# Patient Record
Sex: FEMALE | Race: OTHER | Employment: UNEMPLOYED | URBAN - METROPOLITAN AREA
[De-identification: names, ages, dates, MRNs, and addresses within clinical notes are randomized per-mention and may not be internally consistent; named-entity substitution may affect disease eponyms.]

---

## 2017-02-28 ENCOUNTER — OFFICE VISIT (OUTPATIENT)
Dept: FAMILY MEDICINE CLINIC | Age: 27
End: 2017-02-28

## 2017-02-28 VITALS
TEMPERATURE: 98.2 F | HEIGHT: 62 IN | DIASTOLIC BLOOD PRESSURE: 77 MMHG | SYSTOLIC BLOOD PRESSURE: 118 MMHG | WEIGHT: 146 LBS | OXYGEN SATURATION: 98 % | RESPIRATION RATE: 18 BRPM | BODY MASS INDEX: 26.87 KG/M2 | HEART RATE: 85 BPM

## 2017-02-28 DIAGNOSIS — R30.0 BURNING WITH URINATION: Primary | ICD-10-CM

## 2017-02-28 DIAGNOSIS — N94.9 VAGINAL BURNING: ICD-10-CM

## 2017-02-28 DIAGNOSIS — R10.9 FLANK PAIN: ICD-10-CM

## 2017-02-28 LAB
BILIRUB UR QL STRIP: NEGATIVE
GLUCOSE UR-MCNC: NEGATIVE MG/DL
KETONES P FAST UR STRIP-MCNC: NEGATIVE MG/DL
PH UR STRIP: 6.5 [PH] (ref 4.6–8)
PROT UR QL STRIP: NEGATIVE MG/DL
SP GR UR STRIP: 1.02 (ref 1–1.03)
UA UROBILINOGEN AMB POC: NORMAL (ref 0.2–1)
URINALYSIS CLARITY POC: CLEAR
URINALYSIS COLOR POC: YELLOW
URINE BLOOD POC: NEGATIVE
URINE LEUKOCYTES POC: NEGATIVE
URINE NITRITES POC: NEGATIVE

## 2017-02-28 NOTE — PATIENT INSTRUCTIONS
Painful Urination (Dysuria): Care Instructions  Your Care Instructions  Burning pain with urination (dysuria) is a common symptom of a urinary tract infection or other urinary problems. The bladder may become inflamed. This can cause pain when the bladder fills and empties. You may also feel pain if the tube that carries urine from the bladder to the outside of the body (urethra) gets irritated or infected. Sexually transmitted infections (STIs) also may cause pain when you urinate. Sometimes the pain can be caused by things other than an infection. The urethra can be irritated by soaps, perfumes, or foreign objects in the urethra. Kidney stones can cause pain when they pass through the urethra. The cause may be hard to find. You may need tests. Treatment for painful urination depends on the cause. Follow-up care is a key part of your treatment and safety. Be sure to make and go to all appointments, and call your doctor if you are having problems. It's also a good idea to know your test results and keep a list of the medicines you take. How can you care for yourself at home? · Drink extra water and juices such as cranberry and blueberry juices for the next day or two. This will help make the urine less concentrated. And it may help wash out any bacteria that may be causing an infection. (If you have kidney, heart, or liver disease and have to limit fluids, talk with your doctor before you increase the amount of fluids you drink.)  · Avoid drinks that are carbonated or have caffeine. They can irritate the bladder. · Urinate often. Try to empty your bladder each time. For women:  · Urinate right after you have sex. · After going to the bathroom, wipe from front to back. · Avoid douches, bubble baths, and feminine hygiene sprays. And avoid other feminine hygiene products that have deodorants. When should you call for help?   Call your doctor now or seek immediate medical care if:  · You have new symptoms, such as fever, nausea, or vomiting. · You have new or worse symptoms of a urinary problem. For example:  ¨ You have blood or pus in your urine. ¨ You have chills or body aches. ¨ It hurts worse to urinate. ¨ You have groin or belly pain. ¨ You have pain in your back just below your rib cage (the flank area). Watch closely for changes in your health, and be sure to contact your doctor if you have any problems. Where can you learn more? Go to http://leticia-joe.info/. Enter S328 in the search box to learn more about \"Painful Urination (Dysuria): Care Instructions. \"  Current as of: August 12, 2016  Content Version: 11.1  © 2445-2128 [employer]. Care instructions adapted under license by [employer] (which disclaims liability or warranty for this information). If you have questions about a medical condition or this instruction, always ask your healthcare professional. [place] [address] any warranty or liability for your use of this information. Human Papillomavirus (HPV): Care Instructions  Your Care Instructions  The human papillomavirus (HPV) is a very common virus. There are many types of HPV. Some types cause the common skin wart. Other types cause genital warts, which can be spread by sexual contact. Some types can increase the risk for cervical and anal cancer. Having one type of HPV does not lead to having another type. Many women who have HPV may not know that they are infected until it is found with a Pap test. Your doctor uses this test to look for abnormal cells on your cervix. If you have had an abnormal Pap test, your doctor may recommend that you have an HPV test.  Like a Pap test, an HPV test is done on a sample of cells collected from the cervix. If the test finds that you have the types of HPV that might lead to cancer, your doctor may suggest more tests.  This does not mean that you will develop cancer; it means that you may have an increased risk. Abnormal cell changes caused by HPV often go away on their own. If the changes do not go away, they can be treated. But because HPV can stay inside the body, the abnormal cervical cells sometimes come back. This is why it is important to follow up with your doctor and have regular Pap tests. Follow-up care is a key part of your treatment and safety. Be sure to make and go to all appointments, and call your doctor if you are having problems. Its also a good idea to know your test results and keep a list of the medicines you take. How can you care for yourself at home? · If you are going to have a Pap or HPV test, do not douche or use tampons or vaginal creams in the 24 hours before the test.  · Do not smoke. Smoking increases the risk for cervical problems and abnormal Pap tests. If you need help quitting, talk to your doctor about stop-smoking programs and medicines. These can increase your chances of quitting for good. · Use latex condoms every time you have sex. Use them from the beginning to the end of sexual contact. · Be sure to tell your sexual partner or partners that you have HPV. Even if you do not have symptoms, you can still pass HPV to others. · Having one sex partner (who does not have STIs and does not have sex with anyone else) is a good way to avoid STIs. When should you call for help? Watch closely for changes in your health, and be sure to contact your doctor if:  · You have vaginal pain during or after sex. · You have vaginal bleeding when you are not in your menstrual period. Where can you learn more? Go to http://leticia-joe.info/. Enter F690 in the search box to learn more about \"Human Papillomavirus (HPV): Care Instructions. \"  Current as of: May 27, 2016  Content Version: 11.1  © 1152-9523 [employer].  Care instructions adapted under license by [employer] (which disclaims liability or warranty for this information). If you have questions about a medical condition or this instruction, always ask your healthcare professional. [place] [address] any warranty or liability for your use of this information.

## 2017-02-28 NOTE — PROGRESS NOTES
Chief Complaint   Patient presents with    Well Woman     Requesting well woman & CPE      1. Have you been to the ER, urgent care clinic since your last visit? Hospitalized since your last visit? No    2. Have you seen or consulted any other health care providers outside of the [address] [place] [address] since your last visit? Include any pap smears or colon screening.  No

## 2017-02-28 NOTE — MR AVS SNAPSHOT
Visit Information Date & Time Provider Department Dept. Phone Encounter #  
 2/28/2017  9:00 AM Vish Julian  [facility] [phone] [id number] Follow-up Instructions Return in about 4 weeks (around 3/28/2017) for well woman check. Upcoming Health Maintenance Date Due  
 HPV AGE 9Y-34Y (1 of 3 - Female 3 Dose Series) 3/11/2001 DTaP/Tdap/Td series (1 - Tdap) 3/11/2011 INFLUENZA AGE 9 TO ADULT 8/1/2016 PAP AKA CERVICAL CYTOLOGY 3/24/2018 Allergies as of 2/28/2017  Review Complete On: 2/28/2017 By: Tab Hancock No Known Allergies Current Immunizations  Never Reviewed No immunizations on file. Not reviewed this visit You Were Diagnosed With   
  
 Codes Comments Burning with urination    -  Primary ICD-10-CM: R30.0 ICD-9-CM: 461. 1 Flank pain     ICD-10-CM: R10.9 ICD-9-CM: 789.09 Vaginal burning     ICD-10-CM: N94.9 ICD-9-CM: 625.8 Vitals BP  
  
  
  
  
  
 118/77 (BP 1 Location: Right arm, BP Patient Position: Sitting) BMI and BSA Data Body Mass Index Body Surface Area  
 26.7 kg/m 2 1.7 m 2 Preferred Pharmacy Pharmacy Name Phone [id number], [address] [phone] Your Updated Medication List  
  
Notice  As of 2/28/2017 10:02 AM  
 You have not been prescribed any medications. We Performed the Following AMB POC URINALYSIS DIP STICK AUTO W/O MICRO [69142 CPT(R)] CULTURE, URINE [id number] CPT(R)] [address] [id number] Custom] Follow-up Instructions Return in about 4 weeks (around 3/28/2017) for well woman check. Patient Instructions Painful Urination (Dysuria): Care Instructions Your Care Instructions Burning pain with urination (dysuria) is a common symptom of a urinary tract infection or other urinary problems.  The bladder may become inflamed. This can cause pain when the bladder fills and empties. You may also feel pain if the tube that carries urine from the bladder to the outside of the body (urethra) gets irritated or infected. Sexually transmitted infections (STIs) also may cause pain when you urinate. Sometimes the pain can be caused by things other than an infection. The urethra can be irritated by soaps, perfumes, or foreign objects in the urethra. Kidney stones can cause pain when they pass through the urethra. The cause may be hard to find. You may need tests. Treatment for painful urination depends on the cause. Follow-up care is a key part of your treatment and safety. Be sure to make and go to all appointments, and call your doctor if you are having problems. It's also a good idea to know your test results and keep a list of the medicines you take. How can you care for yourself at home? · Drink extra water and juices such as cranberry and blueberry juices for the next day or two. This will help make the urine less concentrated. And it may help wash out any bacteria that may be causing an infection. (If you have kidney, heart, or liver disease and have to limit fluids, talk with your doctor before you increase the amount of fluids you drink.) · Avoid drinks that are carbonated or have caffeine. They can irritate the bladder. · Urinate often. Try to empty your bladder each time. For women: · Urinate right after you have sex. · After going to the bathroom, wipe from front to back. · Avoid douches, bubble baths, and feminine hygiene sprays. And avoid other feminine hygiene products that have deodorants. When should you call for help? Call your doctor now or seek immediate medical care if: 
· You have new symptoms, such as fever, nausea, or vomiting. · You have new or worse symptoms of a urinary problem. For example: ¨ You have blood or pus in your urine. ¨ You have chills or body aches. ¨ It hurts worse to urinate. ¨ You have groin or belly pain. ¨ You have pain in your back just below your rib cage (the flank area). Watch closely for changes in your health, and be sure to contact your doctor if you have any problems. Where can you learn more? Go to http://leticia-joe.info/. Enter P599 in the search box to learn more about \"Painful Urination (Dysuria): Care Instructions. \" Current as of: August 12, 2016 Content Version: 11.1 © 4048-9305 [employer]. Care instructions adapted under license by [employer] (which disclaims liability or warranty for this information). If you have questions about a medical condition or this instruction, always ask your healthcare professional. [address] any warranty or liability for your use of this information. Human Papillomavirus (HPV): Care Instructions Your Care Instructions The human papillomavirus (HPV) is a very common virus. There are many types of HPV. Some types cause the common skin wart. Other types cause genital warts, which can be spread by sexual contact. Some types can increase the risk for cervical and anal cancer. Having one type of HPV does not lead to having another type. Many women who have HPV may not know that they are infected until it is found with a Pap test. Your doctor uses this test to look for abnormal cells on your cervix. If you have had an abnormal Pap test, your doctor may recommend that you have an HPV test. 
Like a Pap test, an HPV test is done on a sample of cells collected from the cervix. If the test finds that you have the types of HPV that might lead to cancer, your doctor may suggest more tests. This does not mean that you will develop cancer; it means that you may have an increased risk. Abnormal cell changes caused by HPV often go away on their own. If the changes do not go away, they can be treated.  But because HPV can stay inside the body, the abnormal cervical cells sometimes come back. This is why it is important to follow up with your doctor and have regular Pap tests. Follow-up care is a key part of your treatment and safety. Be sure to make and go to all appointments, and call your doctor if you are having problems. Its also a good idea to know your test results and keep a list of the medicines you take. How can you care for yourself at home? · If you are going to have a Pap or HPV test, do not douche or use tampons or vaginal creams in the 24 hours before the test. 
· Do not smoke. Smoking increases the risk for cervical problems and abnormal Pap tests. If you need help quitting, talk to your doctor about stop-smoking programs and medicines. These can increase your chances of quitting for good. · Use latex condoms every time you have sex. Use them from the beginning to the end of sexual contact. · Be sure to tell your sexual partner or partners that you have HPV. Even if you do not have symptoms, you can still pass HPV to others. · Having one sex partner (who does not have STIs and does not have sex with anyone else) is a good way to avoid STIs. When should you call for help? Watch closely for changes in your health, and be sure to contact your doctor if: 
· You have vaginal pain during or after sex. · You have vaginal bleeding when you are not in your menstrual period. Where can you learn more? Go to http://leticia-joe.info/. Enter F690 in the search box to learn more about \"Human Papillomavirus (HPV): Care Instructions. \" Current as of: May 27, 2016 Content Version: 11.1 © 8340-0011 [employer]. Care instructions adapted under license by [employer] (which disclaims liability or warranty for this information).  If you have questions about a medical condition or this instruction, always ask your healthcare professional. [employer] disclaims any warranty or liability for your use of this information. Introducing [facility]! Dagoberto Carrington introduces [employer] patient portal. Now you can access parts of your medical record, email your doctor's office, and request medication refills online. 1. In your internet browser, go to [email or web address] 2. Click on the First Time User? Click Here link in the Sign In box. You will see the New Member Sign Up page. 3. Enter your [employer] Access Code exactly as it appears below. You will not need to use this code after youve completed the sign-up process. If you do not sign up before the expiration date, you must request a new code. · [employer] Access Code: [email or web address] Expires: 5/29/2017 10:02 AM 
 
4. Enter the last four digits of your Social Security Number (xxxx) and Date of Birth (mm/dd/yyyy) as indicated and click Submit. You will be taken to the next sign-up page. 5. Create a [employer] ID. This will be your [employer] login ID and cannot be changed, so think of one that is secure and easy to remember. 6. Create a [employer] password. You can change your password at any time. 7. Enter your Password Reset Question and Answer. This can be used at a later time if you forget your password. 8. Enter your e-mail address. You will receive e-mail notification when new information is available in [address] 9. Click Sign Up. You can now view and download portions of your medical record. 10. Click the Download Summary menu link to download a portable copy of your medical information. If you have questions, please visit the Frequently Asked Questions section of the [employer] website. Remember, [employer] is NOT to be used for urgent needs. For medical emergencies, dial 911. Now available from your iPhone and Android! Please provide this summary of care documentation to your next provider. Your primary care clinician is listed as Adriana Soares. If you have any questions after today's visit, please call [phone].

## 2017-03-01 NOTE — PROGRESS NOTES
Dipika Bourgeois is a 32 y.o. female who presents with the following complaints:  Chief Complaint   Patient presents with    Well Woman     Requesting well woman & CPE        Subjective:    HPI:   C/o burning pain with urination, increased vaginal discharge, sensation of \"warmness\" or heat in the vagina, and left flank and low back pain. She is concerned since these are the symptoms she had when she developed pyelo before. No fever or chills. No visible blood in urine. No pelvic pain or pain with intercourse. Pertinent PMH/FH/SH:  No past medical history on file. Past Surgical History:   Procedure Laterality Date    HX LIPOSUCTION Bilateral 2014     Family History   Problem Relation Age of Onset    Hypertension Mother      Social History     Social History    Marital status: LEGALLY      Spouse name: N/A    Number of children: N/A    Years of education: N/A     Social History Main Topics    Smoking status: Never Smoker    Smokeless tobacco: Never Used    Alcohol use No    Drug use: No    Sexual activity: Yes     Birth control/ protection: None     Other Topics Concern    None     Social History Narrative     Advanced Directives: N      There are no active problems to display for this patient.       Nurse notes were reviewed and are correct  Review of Systems - negative except as listed above in the HPI    Objective:     Vitals:    02/28/17 0923   BP: 118/77   Pulse: 85   Resp: 18   Temp: 98.2 °F (36.8 °C)   TempSrc: Oral   SpO2: 98%   Weight: 146 lb (66.2 kg)   Height: 5' 2\" (1.575 m)     Physical Examination: General appearance - alert, well appearing, and in no distress, oriented to person, place, and time and normal appearing weight  Mental status - normal mood, behavior, speech, dress, motor activity, and thought processes  Neck - supple, no significant adenopathy  Chest - clear to auscultation, no wheezes, rales or rhonchi, symmetric air entry  Heart - normal rate, regular rhythm, normal S1, S2, no murmurs, rubs, clicks or gallops  Abdomen - soft, nontender, nondistended, no masses or organomegaly  bowel sounds normal  no bladder distension noted  no CVA tenderness  Neurological - alert, oriented, normal speech, no focal findings or movement disorder noted  Skin - normal coloration and turgor    Results for orders placed or performed in visit on 02/28/17   AMB POC URINALYSIS DIP STICK AUTO W/O MICRO   Result Value Ref Range    Color (UA POC) Yellow     Clarity (UA POC) Clear     Glucose (UA POC) Negative Negative    Bilirubin (UA POC) Negative Negative    Ketones (UA POC) Negative Negative    Specific gravity (UA POC) 1.025 1.001 - 1.035    Blood (UA POC) Negative Negative    pH (UA POC) 6.5 4.6 - 8.0    Protein (UA POC) Negative Negative mg/dL    Urobilinogen (UA POC) 0.2 mg/dL 0.2 - 1    Nitrites (UA POC) Negative Negative    Leukocyte esterase (UA POC) Negative Negative         Assessment/ Plan:   Liam Alvarado was seen today for well woman. Diagnoses and all orders for this visit:    Burning with urination  Flank pain  Push fluids  Avoid bladder irritants  Add cranberry  Wipe front to back  Empty bladder frequently  -     AMB POC URINALYSIS DIP STICK AUTO W/O MICRO  -     CULTURE, URINE    Vaginal burning  -     NUSWAB VAGINITIS PLUS    Patient's last pap 3/24/14, normal. She will return for WWE after that date in order to have visit covered by her insurance. Follow-up Disposition:  Return in about 4 weeks (around 3/28/2017) for well woman check. I have discussed the diagnosis with the patient and the intended plan as seen in the above orders. The patient has received an after-visit summary and questions were answered concerning future plans. The patient verbalizes understanding.     Medication Side Effects and Warnings were discussed with patient: yes  Patient Labs were reviewed and or requested: yes  Patient Past Records were reviewed and or requested: yes    Patient Instructions Painful Urination (Dysuria): Care Instructions  Your Care Instructions  Burning pain with urination (dysuria) is a common symptom of a urinary tract infection or other urinary problems. The bladder may become inflamed. This can cause pain when the bladder fills and empties. You may also feel pain if the tube that carries urine from the bladder to the outside of the body (urethra) gets irritated or infected. Sexually transmitted infections (STIs) also may cause pain when you urinate. Sometimes the pain can be caused by things other than an infection. The urethra can be irritated by soaps, perfumes, or foreign objects in the urethra. Kidney stones can cause pain when they pass through the urethra. The cause may be hard to find. You may need tests. Treatment for painful urination depends on the cause. Follow-up care is a key part of your treatment and safety. Be sure to make and go to all appointments, and call your doctor if you are having problems. It's also a good idea to know your test results and keep a list of the medicines you take. How can you care for yourself at home? · Drink extra water and juices such as cranberry and blueberry juices for the next day or two. This will help make the urine less concentrated. And it may help wash out any bacteria that may be causing an infection. (If you have kidney, heart, or liver disease and have to limit fluids, talk with your doctor before you increase the amount of fluids you drink.)  · Avoid drinks that are carbonated or have caffeine. They can irritate the bladder. · Urinate often. Try to empty your bladder each time. For women:  · Urinate right after you have sex. · After going to the bathroom, wipe from front to back. · Avoid douches, bubble baths, and feminine hygiene sprays. And avoid other feminine hygiene products that have deodorants. When should you call for help?   Call your doctor now or seek immediate medical care if:  · You have new symptoms, such as fever, nausea, or vomiting. · You have new or worse symptoms of a urinary problem. For example:  ¨ You have blood or pus in your urine. ¨ You have chills or body aches. ¨ It hurts worse to urinate. ¨ You have groin or belly pain. ¨ You have pain in your back just below your rib cage (the flank area). Watch closely for changes in your health, and be sure to contact your doctor if you have any problems. Where can you learn more? Go to http://leticia-joe.info/. Enter Z321 in the search box to learn more about \"Painful Urination (Dysuria): Care Instructions. \"  Current as of: August 12, 2016  Content Version: 11.1  © 6947-9500 [employer]. Care instructions adapted under license by [employer] (which disclaims liability or warranty for this information). If you have questions about a medical condition or this instruction, always ask your healthcare professional. [place] [address] any warranty or liability for your use of this information. Human Papillomavirus (HPV): Care Instructions  Your Care Instructions  The human papillomavirus (HPV) is a very common virus. There are many types of HPV. Some types cause the common skin wart. Other types cause genital warts, which can be spread by sexual contact. Some types can increase the risk for cervical and anal cancer. Having one type of HPV does not lead to having another type. Many women who have HPV may not know that they are infected until it is found with a Pap test. Your doctor uses this test to look for abnormal cells on your cervix. If you have had an abnormal Pap test, your doctor may recommend that you have an HPV test.  Like a Pap test, an HPV test is done on a sample of cells collected from the cervix. If the test finds that you have the types of HPV that might lead to cancer, your doctor may suggest more tests.  This does not mean that you will develop cancer; it means that you may have an increased risk. Abnormal cell changes caused by HPV often go away on their own. If the changes do not go away, they can be treated. But because HPV can stay inside the body, the abnormal cervical cells sometimes come back. This is why it is important to follow up with your doctor and have regular Pap tests. Follow-up care is a key part of your treatment and safety. Be sure to make and go to all appointments, and call your doctor if you are having problems. Its also a good idea to know your test results and keep a list of the medicines you take. How can you care for yourself at home? · If you are going to have a Pap or HPV test, do not douche or use tampons or vaginal creams in the 24 hours before the test.  · Do not smoke. Smoking increases the risk for cervical problems and abnormal Pap tests. If you need help quitting, talk to your doctor about stop-smoking programs and medicines. These can increase your chances of quitting for good. · Use latex condoms every time you have sex. Use them from the beginning to the end of sexual contact. · Be sure to tell your sexual partner or partners that you have HPV. Even if you do not have symptoms, you can still pass HPV to others. · Having one sex partner (who does not have STIs and does not have sex with anyone else) is a good way to avoid STIs. When should you call for help? Watch closely for changes in your health, and be sure to contact your doctor if:  · You have vaginal pain during or after sex. · You have vaginal bleeding when you are not in your menstrual period. Where can you learn more? Go to http://leticia-joe.info/. Enter F690 in the search box to learn more about \"Human Papillomavirus (HPV): Care Instructions. \"  Current as of: May 27, 2016  Content Version: 11.1  © 0405-5267 [employer].  Care instructions adapted under license by [employer] (which disclaims liability or warranty for this information). If you have questions about a medical condition or this instruction, always ask your healthcare professional. [place] [address] any warranty or liability for your use of this information.           Jared MUKHERJEE

## 2017-03-02 LAB — BACTERIA UR CULT: NO GROWTH

## 2017-03-04 LAB
A VAGINAE DNA VAG QL NAA+PROBE: NORMAL SCORE
BVAB2 DNA VAG QL NAA+PROBE: NORMAL SCORE
C ALBICANS DNA VAG QL NAA+PROBE: NEGATIVE
C GLABRATA DNA VAG QL NAA+PROBE: NEGATIVE
C TRACH RRNA SPEC QL NAA+PROBE: NEGATIVE
MEGA1 DNA VAG QL NAA+PROBE: NORMAL SCORE
N GONORRHOEA RRNA SPEC QL NAA+PROBE: NEGATIVE
T VAGINALIS RRNA SPEC QL NAA+PROBE: NEGATIVE

## 2017-03-06 NOTE — PROGRESS NOTES
Spoke with patient using two pt identifiers. Patient has been advised of negative urine culture results. Patient verbalized understanding and had no questions at this time.

## 2017-03-06 NOTE — PROGRESS NOTES
Spoke with patient using two pt identifiers. Patient has been advised of negative vaginal swab results. Patient states that her results are always  Negative, however, she is starting to have some discharge. She denies and odor and states that it is the same color. Patient would josé to know if there can be something called in for her. Please advise. Thanks.

## 2017-03-07 NOTE — PROGRESS NOTES
When I last treated her, her swab was positive for BV. i can't prescribe treatment at the present time with negative swab. If symptoms are bothersome, i recommend we put in a referral to gyn for further evaluation.

## 2017-03-08 NOTE — PROGRESS NOTES
Left message for patient and advised of note from [facility]. Patient advised to return call with any questions/ concerns.

## 2017-05-02 ENCOUNTER — HOSPITAL ENCOUNTER (OUTPATIENT)
Dept: GENERAL RADIOLOGY | Age: 27
Discharge: HOME | End: 2017-05-02

## 2017-05-02 ENCOUNTER — OFFICE VISIT (OUTPATIENT)
Dept: INTERNAL MEDICINE CLINIC | Age: 27
End: 2017-05-02

## 2017-05-02 VITALS
TEMPERATURE: 98.5 F | BODY MASS INDEX: 26.43 KG/M2 | DIASTOLIC BLOOD PRESSURE: 94 MMHG | RESPIRATION RATE: 16 BRPM | HEART RATE: 86 BPM | SYSTOLIC BLOOD PRESSURE: 128 MMHG | HEIGHT: 62 IN | WEIGHT: 143.6 LBS | OXYGEN SATURATION: 92 %

## 2017-05-02 DIAGNOSIS — M25.511 ACUTE PAIN OF BOTH SHOULDERS: ICD-10-CM

## 2017-05-02 DIAGNOSIS — M62.838 MUSCLE SPASM: ICD-10-CM

## 2017-05-02 DIAGNOSIS — R07.9 CHEST PAIN AT REST: ICD-10-CM

## 2017-05-02 DIAGNOSIS — M25.512 ACUTE PAIN OF BOTH SHOULDERS: ICD-10-CM
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
## 2017-05-02 DIAGNOSIS — M54.2 NECK PAIN: ICD-10-CM

## 2017-05-02 DIAGNOSIS — R03.0 TEMPORARY HIGH BLOOD PRESSURE: ICD-10-CM
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
## 2017-05-02 PROCEDURE — 73030 X-RAY EXAM OF SHOULDER: CPT

## 2017-05-02 PROCEDURE — 70360 X-RAY EXAM OF NECK: CPT

## 2017-05-02 PROCEDURE — 72050 X-RAY EXAM NECK SPINE 4/5VWS: CPT

## 2017-05-02 PROCEDURE — 71020 XR CHEST PA LAT: CPT

## 2017-05-02 RX ORDER — IBUPROFEN 400 MG/1
400 TABLET ORAL
COMMUNITY
Start: 2017-05-02 | End: 2018-02-12 | Stop reason: ALTCHOICE

## 2017-05-02 RX ORDER — METHOCARBAMOL 500 MG/1
500 TABLET, FILM COATED ORAL 3 TIMES DAILY
Qty: 30 TAB | Refills: 0 | Status: SHIPPED | OUTPATIENT
Start: 2017-05-02 | End: 2017-05-11 | Stop reason: SDUPTHER

## 2017-05-02 RX ORDER — CYCLOBENZAPRINE HCL 10 MG
TABLET ORAL
COMMUNITY
End: 2017-05-02

## 2017-05-02 RX ORDER — IBUPROFEN 600 MG/1
TABLET ORAL
COMMUNITY
End: 2017-05-02

## 2017-05-02 NOTE — LETTER
NOTIFICATION RETURN TO WORK / SCHOOL 
 
5/2/2017 10:38 AM 
 
Ms. Argentina Bullock [address] [place] [id number] To Whom It May Concern: 
 
Argentina Bullock is currently under the care of [address] [place] [address]. She will return to work/school on: 5/9 If there are questions or concerns please have the patient contact our office. Sincerely, Karli Easley MD
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
## 2017-05-04 NOTE — PROGRESS NOTES
Ms. Weir ,     All imaging tests are normal.   You need therapies and along with anti-inflammatory motrin with muscle relaxants. Massage therapy may help. Please check with your insurance on coverage. Will you at your next follow up appointment.      Kindly,

## 2017-05-04 NOTE — PROGRESS NOTES
MsKeny Day More,     All imaging tests are normal.   You need therapies and along with anti-inflammatory motrin with muscle relaxants. Massage therapy may help. Please check with your insurance on coverage. Will you at your next follow up appointment.      Kindly,

## 2017-05-04 NOTE — PROGRESS NOTES
Ms. Gabriele Belcher,     All imaging tests are normal.   You need therapies and along with anti-inflammatory motrin with muscle relaxants. Massage therapy may help. Please check with your insurance on coverage. Will you at your next follow up appointment.      Kindly,

## 2017-05-04 NOTE — PROGRESS NOTES
Ms. Galdino Jett,     All imaging tests are normal.   You need therapies and along with anti-inflammatory motrin with muscle relaxants. Massage therapy may help. Please check with your insurance on coverage. Will you at your next follow up appointment.      Kindly,

## 2017-05-04 NOTE — PROGRESS NOTES
Ms. Lozano Faster,     All imaging tests are normal.   You need therapies and along with anti-inflammatory motrin with muscle relaxants. Massage therapy may help. Please check with your insurance on coverage. Will you at your next follow up appointment.      Kindly,
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
## 2017-05-11 ENCOUNTER — OFFICE VISIT (OUTPATIENT)
Dept: INTERNAL MEDICINE CLINIC | Age: 27
End: 2017-05-11

## 2017-05-11 VITALS
DIASTOLIC BLOOD PRESSURE: 72 MMHG | TEMPERATURE: 98.8 F | OXYGEN SATURATION: 99 % | HEART RATE: 96 BPM | RESPIRATION RATE: 16 BRPM | BODY MASS INDEX: 26.46 KG/M2 | SYSTOLIC BLOOD PRESSURE: 110 MMHG | WEIGHT: 143.8 LBS | HEIGHT: 62 IN

## 2017-05-11 DIAGNOSIS — M62.838 MUSCLE SPASMS OF NECK: ICD-10-CM

## 2017-05-11 DIAGNOSIS — M54.2 NECK PAIN: Primary | ICD-10-CM

## 2017-05-11 DIAGNOSIS — M62.830 MUSCLE SPASM OF BACK: ICD-10-CM

## 2017-05-11 DIAGNOSIS — M54.9 ACUTE BILATERAL BACK PAIN, UNSPECIFIED BACK LOCATION: ICD-10-CM

## 2017-05-11 RX ORDER — METHOCARBAMOL 500 MG/1
1000 TABLET, FILM COATED ORAL 3 TIMES DAILY
Qty: 30 TAB | Refills: 0 | Status: SHIPPED | OUTPATIENT
Start: 2017-05-11 | End: 2018-02-12 | Stop reason: ALTCHOICE
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
## 2017-05-11 NOTE — LETTER
NOTIFICATION RETURN TO WORK / SCHOOL 
 
5/11/2017 10:35 AM 
 
Ms. Ebenezer Franklin [address] [place] [id number] To Whom It May Concern: 
 
Ebenezer Areas is currently under the care of [address] [place] [address]. She will return to work/school on: 5/25 If there are questions or concerns please have the patient contact our office. Sincerely, Hodan Michael MD

## 2017-05-11 NOTE — PATIENT INSTRUCTIONS
Back Pain: Care Instructions  Your Care Instructions    Back pain has many possible causes. It is often related to problems with muscles and ligaments of the back. It may also be related to problems with the nerves, discs, or bones of the back. Moving, lifting, standing, sitting, or sleeping in an awkward way can strain the back. Sometimes you don't notice the injury until later. Arthritis is another common cause of back pain. Although it may hurt a lot, back pain usually improves on its own within several weeks. Most people recover in 12 weeks or less. Using good home treatment and being careful not to stress your back can help you feel better sooner. Follow-up care is a key part of your treatment and safety. Be sure to make and go to all appointments, and call your doctor if you are having problems. Its also a good idea to know your test results and keep a list of the medicines you take. How can you care for yourself at home? · Sit or lie in positions that are most comfortable and reduce your pain. Try one of these positions when you lie down:  ¨ Lie on your back with your knees bent and supported by large pillows. ¨ Lie on the floor with your legs on the seat of a sofa or chair. Jim Neighbor on your side with your knees and hips bent and a pillow between your legs. ¨ Lie on your stomach if it does not make pain worse. · Do not sit up in bed, and avoid soft couches and twisted positions. Bed rest can help relieve pain at first, but it delays healing. Avoid bed rest after the first day of back pain. · Change positions every 30 minutes. If you must sit for long periods of time, take breaks from sitting. Get up and walk around, or lie in a comfortable position. · Try using a heating pad on a low or medium setting for 15 to 20 minutes every 2 or 3 hours. Try a warm shower in place of one session with the heating pad. · You can also try an ice pack for 10 to 15 minutes every 2 to 3 hours.  Put a thin cloth between the ice pack and your skin. · Take pain medicines exactly as directed. ¨ If the doctor gave you a prescription medicine for pain, take it as prescribed. ¨ If you are not taking a prescription pain medicine, ask your doctor if you can take an over-the-counter medicine. · Take short walks several times a day. You can start with 5 to 10 minutes, 3 or 4 times a day, and work up to longer walks. Walk on level surfaces and avoid hills and stairs until your back is better. · Return to work and other activities as soon as you can. Continued rest without activity is usually not good for your back. · To prevent future back pain, do exercises to stretch and strengthen your back and stomach. Learn how to use good posture, safe lifting techniques, and proper body mechanics. When should you call for help? Call your doctor now or seek immediate medical care if:  · You have new or worsening numbness in your legs. · You have new or worsening weakness in your legs. (This could make it hard to stand up.)  · You lose control of your bladder or bowels. Watch closely for changes in your health, and be sure to contact your doctor if:  · Your pain gets worse. · You are not getting better after 2 weeks. Where can you learn more? Go to http://leticia-joe.info/. Enter I411 in the search box to learn more about \"Back Pain: Care Instructions. \"  Current as of: May 23, 2016  Content Version: 11.2  © 1748-1014 [employer]. Care instructions adapted under license by [employer] (which disclaims liability or warranty for this information). If you have questions about a medical condition or this instruction, always ask your healthcare professional. [place] [address] any warranty or liability for your use of this information.
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
## 2017-05-16 ENCOUNTER — HOSPITAL ENCOUNTER (OUTPATIENT)
Dept: PHYSICAL THERAPY | Age: 27
Discharge: HOME | End: 2017-05-16
Payer: MEDICAID

## 2017-05-16 PROCEDURE — 97161 PT EVAL LOW COMPLEX 20 MIN: CPT | Performed by: PHYSICAL THERAPIST

## 2017-05-16 PROCEDURE — 97110 THERAPEUTIC EXERCISES: CPT | Performed by: PHYSICAL THERAPIST
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
## 2017-05-23 ENCOUNTER — APPOINTMENT (OUTPATIENT)
Dept: PHYSICAL THERAPY | Age: 27
End: 2017-05-23
Payer: MEDICAID

## 2017-05-31 ENCOUNTER — HOSPITAL ENCOUNTER (OUTPATIENT)
Dept: PHYSICAL THERAPY | Age: 27
Discharge: HOME | End: 2017-05-31
Payer: MEDICAID

## 2017-05-31 PROCEDURE — 97110 THERAPEUTIC EXERCISES: CPT | Performed by: PHYSICAL THERAPIST

## 2017-05-31 NOTE — PROGRESS NOTES
PT DAILY TREATMENT NOTE 2-15    Patient Name: Jorge Renee  Date:2017  : 1990  [x]  Patient  Verified  Payor: [facility] PATTI [place] / Plan: [facility] / Product Type: Managed Care Medicaid /    In time:9:30 AM  Out time:10:20 AM  Total Treatment Time (min): 50 (40 timed)  Visit #: 2     Treatment Area: Neck pain [M54.2]  Bilateral shoulder pain [M25.511, M25.512]    SUBJECTIVE  Pain Level (0-10 scale): 5/10  Any medication changes, allergies to medications, adverse drug reactions, diagnosis change, or new procedure performed?: [x] No    [] Yes (see summary sheet for update)  Subjective functional status/changes:   [] No changes reported  Pt states she is feeling a little better. \"It's hard doing the exercises at home because I don't know if I'm doing them right\". She has been icing 4x/wk.     OBJECTIVE    Modality rationale: decrease pain to improve the patients ability to perform household chores   Min Type Additional Details    [] Estim: []Att   []Unatt        []TENS instruct                  []IFC  []Premod   []NMES                     []Other:  []w/US   []w/ice   []w/heat  Position:  Location:    []  Traction: [] Cervical       []Lumbar                       [] Prone          []Supine                       []Intermittent   []Continuous Lbs:  [] before manual  [] after manual  []w/heat    []  Ultrasound: []Continuous   [] Pulsed at:                            []1MHz   []3MHz Location:  W/cm2:    []  Paraffin         Location:  []w/heat   10 [x]  Ice     []  Heat  []  Ice massage Position:  Location:    []  Laser  []  Other: Position:  Location:    []  Vasopneumatic Device Pressure:       [] lo [] med [] hi   Temperature:    [x] Skin assessment post-treatment:  [x]intact []redness- no adverse reaction    []redness  adverse reaction:     40 min Therapeutic Exercise:  [x] See flow sheet : added serratus punches, flex/scap, Bilat ER, rec elliptical, rows, shoulder extension, corner stretch   Rationale: increase ROM and increase strength to improve the patients ability to perform ADL's, exercise       min Manual Therapy:     Rationale: With   [] TE   [] TA   [] neuro   [] other: Patient Education: [x] Review HEP    [] Progressed/Changed HEP based on:   [] positioning   [] body mechanics   [] transfers   [] heat/ice application    [] other:      Other Objective/Functional Measures: n/a     Pain Level (0-10 scale) post treatment: \"I can't distinguish between cold and pain\"    ASSESSMENT/Changes in Function:     Pt challenged with flex, scap-- performed exercise without weights. Reviewed and corrected HEP. Req'd verbal cues for dec'd UT activation during tband rows and serratus punches. Patient will continue to benefit from skilled PT services to modify and progress therapeutic interventions, address functional mobility deficits, address ROM deficits, address strength deficits, analyze and address soft tissue restrictions, analyze and cue movement patterns, analyze and modify body mechanics/ergonomics and assess and modify postural abnormalities to attain remaining goals.      [x]  See Plan of Care  []  See progress note/recertification  []  See Discharge Summary         Progress towards goals / Updated goals:  nt    PLAN  []  Upgrade activities as tolerated     [x]  Continue plan of care  []  Update interventions per flow sheet       []  Discharge due to:_  []  Other:_      Fiona Collado PT 5/31/2017  9:41 AM

## 2017-06-06 ENCOUNTER — HOSPITAL ENCOUNTER (OUTPATIENT)
Dept: PHYSICAL THERAPY | Age: 27
Discharge: HOME | End: 2017-06-06
Payer: MEDICAID

## 2017-06-06 PROCEDURE — 97110 THERAPEUTIC EXERCISES: CPT | Performed by: PHYSICAL THERAPY ASSISTANT

## 2017-06-06 NOTE — PROGRESS NOTES
PT DAILY TREATMENT NOTE 2-15    Patient Name: Curtis Dimas  Date:2017  : 1990  [x]  Patient  Verified  Payor: [address] / Plan: [address] [facility] / Product Type: Managed Care Medicaid /    In time:9:30 AM  Out time:10:30 AM  Total Treatment Time (min): 60 (50 timed)  Visit #: 3     Treatment Area: Neck pain [M54.2]  Bilateral shoulder pain [M25.511, M25.512]    SUBJECTIVE  Pain Level (0-10 scale): 4/10  Any medication changes, allergies to medications, adverse drug reactions, diagnosis change, or new procedure performed?: [x] No    [] Yes (see summary sheet for update)  Subjective functional status/changes:   [] No changes reported  Pt states \"the exercises always make me feel worse but I guess that's normal.\"    OBJECTIVE    Modality rationale: decrease pain to improve the patients ability to perform household chores   Min Type Additional Details    [] Estim: []Att   []Unatt        []TENS instruct                  []IFC  []Premod   []NMES                     []Other:  []w/US   []w/ice   []w/heat  Position:  Location:    []  Traction: [] Cervical       []Lumbar                       [] Prone          []Supine                       []Intermittent   []Continuous Lbs:  [] before manual  [] after manual  []w/heat    []  Ultrasound: []Continuous   [] Pulsed at:                            []1MHz   []3MHz Location:  W/cm2:    []  Paraffin         Location:  []w/heat   10 [x]  Ice     []  Heat  []  Ice massage Position:  Location:    []  Laser  []  Other: Position:  Location:    []  Vasopneumatic Device Pressure:       [] lo [] med [] hi   Temperature:    [x] Skin assessment post-treatment:  [x]intact []redness- no adverse reaction    []redness  adverse reaction:     50 min Therapeutic Exercise:  [x] See flow sheet : held supine serratus punches due to increased pain,     Added supine cane flexion for gentle AAROM   Rationale: increase ROM and increase strength to improve the patients ability to perform ADL's, exercise       min Manual Therapy:     Rationale: With   [] TE   [] TA   [] neuro   [] other: Patient Education: [x] Review HEP    [] Progressed/Changed HEP based on:   [] positioning   [] body mechanics   [] transfers   [] heat/ice application    [] other:      Other Objective/Functional Measures: n/a     Pain Level (0-10 scale) post treatment: \"I feel tense but I'm sure as the day goes on it will get better. \"    ASSESSMENT/Changes in Function:   Patient with increased pain with supine serratus punches so discontinued. Patient with guarded movements throughout treatment session, added supine cane flexion AAROM to improve motion and patient only able to flex to 90 degrees because of pain (compared to initial evaluation of 140 degrees AROM). Encouraged patient to continue to with gentle ROM/exercises at home to improve tissue mobility and ROM, educated patient that being sedentary can contribute to increased muscle tightness and higher pain levels. Patient will continue to benefit from skilled PT services to modify and progress therapeutic interventions, address functional mobility deficits, address ROM deficits, address strength deficits, analyze and address soft tissue restrictions, analyze and cue movement patterns, analyze and modify body mechanics/ergonomics and assess and modify postural abnormalities to attain remaining goals.      [x]  See Plan of Care  []  See progress note/recertification  []  See Discharge Summary         Progress towards goals / Updated goals:  nt    PLAN  []  Upgrade activities as tolerated     [x]  Continue plan of care  []  Update interventions per flow sheet       []  Discharge due to:_  []  Other:_      Evy Valiente, BEAU 6/6/2017  9:00 AM

## 2017-06-13 ENCOUNTER — APPOINTMENT (OUTPATIENT)
Dept: PHYSICAL THERAPY | Age: 27
End: 2017-06-13
Payer: MEDICAID

## 2017-06-20 ENCOUNTER — HOSPITAL ENCOUNTER (OUTPATIENT)
Dept: PHYSICAL THERAPY | Age: 27
Discharge: HOME | End: 2017-06-20
Payer: MEDICAID

## 2017-06-20 PROCEDURE — 97110 THERAPEUTIC EXERCISES: CPT | Performed by: PHYSICAL THERAPIST

## 2017-06-27 ENCOUNTER — APPOINTMENT (OUTPATIENT)
Dept: PHYSICAL THERAPY | Age: 27
End: 2017-06-27
Payer: MEDICAID

## 2017-07-28 ENCOUNTER — DOCUMENTATION ONLY (OUTPATIENT)
Dept: INTERNAL MEDICINE CLINIC | Age: 27
End: 2017-07-28
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
## 2017-08-01 ENCOUNTER — DOCUMENTATION ONLY (OUTPATIENT)
Dept: INTERNAL MEDICINE CLINIC | Age: 27
End: 2017-08-01

## 2018-02-12 ENCOUNTER — OFFICE VISIT (OUTPATIENT)
Dept: INTERNAL MEDICINE CLINIC | Age: 28
End: 2018-02-12

## 2018-02-12 VITALS
TEMPERATURE: 98.9 F | OXYGEN SATURATION: 20 % | BODY MASS INDEX: 25.76 KG/M2 | HEIGHT: 62 IN | HEART RATE: 64 BPM | SYSTOLIC BLOOD PRESSURE: 100 MMHG | DIASTOLIC BLOOD PRESSURE: 62 MMHG | RESPIRATION RATE: 20 BRPM | WEIGHT: 140 LBS

## 2018-02-12 DIAGNOSIS — R11.2 INTRACTABLE VOMITING WITH NAUSEA, UNSPECIFIED VOMITING TYPE: Primary | ICD-10-CM

## 2018-02-12 DIAGNOSIS — R10.13 EPIGASTRIC PAIN: ICD-10-CM

## 2018-02-12 DIAGNOSIS — M79.10 MYALGIA: ICD-10-CM

## 2018-02-12 DIAGNOSIS — R10.9 ABDOMINAL CRAMPS: ICD-10-CM

## 2018-02-12 RX ORDER — ONDANSETRON 8 MG/1
8 TABLET, ORALLY DISINTEGRATING ORAL
Qty: 15 TAB | Refills: 0 | Status: SHIPPED | OUTPATIENT
Start: 2018-02-12

## 2018-02-12 RX ORDER — OMEPRAZOLE 20 MG/1
20 CAPSULE, DELAYED RELEASE ORAL DAILY
Qty: 30 CAP | Refills: 0 | Status: SHIPPED | OUTPATIENT
Start: 2018-02-12 | End: 2018-03-14

## 2018-02-12 NOTE — PROGRESS NOTES
Chief Complaint   Patient presents with    Vomiting     started havin N/V diarrhea with chills with fever since last wed. .  Since then has been unable to eat or drink. is very weak.  Diarrhea    Fever     1. Have you been to the ER, urgent care clinic since your last visit? Hospitalized since your last visit? No    2. Have you seen or consulted any other health care providers outside of the [address] [place] [address] since your last visit? Include any pap smears or colon screening.  No

## 2018-02-12 NOTE — MR AVS SNAPSHOT
[address] [facility] [address] 
[phone] Patient: Braeden Fernando MRN: [id number] PID:[id number] Visit Information Date & Time Provider Department Dept. Phone Encounter #  
 2/12/2018  1:45 PM Laure Jaquez, [address] [facility],[address] Internal Medicine [phone] [id number] Upcoming Health Maintenance Date Due DTaP/Tdap/Td series (1 - Tdap) 3/11/2011 Influenza Age 5 to Adult 8/1/2017 PAP AKA CERVICAL CYTOLOGY 3/24/2018 Allergies as of 2/12/2018  Review Complete On: 2/12/2018 By: Epi Bro LPN No Known Allergies Current Immunizations  Never Reviewed No immunizations on file. Not reviewed this visit You Were Diagnosed With   
  
 Codes Comments Intractable vomiting with nausea, unspecified vomiting type    -  Primary ICD-10-CM: R11.2 ICD-9-CM: 536.2 Abdominal cramps     ICD-10-CM: R10.9 ICD-9-CM: 789.00 Myalgia     ICD-10-CM: M79.1 ICD-9-CM: 729.1 Epigastric pain     ICD-10-CM: R10.13 ICD-9-CM: 789.06 Vitals BP Pulse Temp Resp Height(growth percentile) Weight(growth percentile) 100/62 64 98.9 °F (37.2 °C) (Oral) 20 5' 2\" (1.575 m) 140 lb (63.5 kg) LMP SpO2 BMI OB Status Smoking Status 01/24/2018 (!) 20% 25.61 kg/m2 Having regular periods Never Smoker BMI and BSA Data Body Mass Index Body Surface Area  
 25.61 kg/m 2 1.67 m 2 Preferred Pharmacy Pharmacy Name Phone [id number], [address] [facility] [phone] Your Updated Medication List  
  
   
This list is accurate as of: 2/12/18  2:19 PM.  Always use your most recent med list.  
  
  
  
  
 omeprazole 20 mg capsule Commonly known as:  PRILOSEC Take 1 Cap by mouth daily for 30 days. ondansetron 8 mg disintegrating tablet Commonly known as:  ZOFRAN ODT Take 1 Tab by mouth every eight (8) hours as needed for Nausea for up to 5 doses. Prescriptions Sent to Pharmacy Refills  
 ondansetron (ZOFRAN ODT) 8 mg disintegrating tablet 0 Sig: Take 1 Tab by mouth every eight (8) hours as needed for Nausea for up to 5 doses. Class: Normal  
 Pharmacy: [id number], [address] [facility] Ph #: [phone] Route: Oral  
 omeprazole (PRILOSEC) 20 mg capsule 0 Sig: Take 1 Cap by mouth daily for 30 days. Class: Normal  
 Pharmacy: [id number], [address] [facility] Ph #: [phone] Route: Oral  
  
Introducing [facility] SERVICES! Dear Cedric Hannon: Thank you for requesting a Catgl[employer] account. Our records indicate that you already have an active Catglobe account. You can access your account anytime at [email or web address] Did you know that you can access your hospital and ER discharge instructions at any time in Catglobe? You can also review all of your test results from your hospital stay or ER visit. Additional Information If you have questions, please visit the Frequently Asked Questions section of the Catglobe website at [email or web address]. [email or web address] Remember, Catglobe is NOT to be used for urgent needs. For medical emergencies, dial 911. Now available from your iPhone and Android! Please provide this summary of care documentation to your next provider. Your primary care clinician is listed as Katharine Qualia. If you have any questions after today's visit, please call [phone].

## 2018-02-12 NOTE — PROGRESS NOTES
Written by Johnie Santillan, as dictated by Dr. Melissa Snyder MD.    Garrett Jean is a 32 y.o. female. HPI  The patient comes in today c/o fever, chills, nausea, vomiting, diarrhea, and weakness/fatigue since Wednesday 02/07. Her son was recently sick with similar sxs, and she was told it was flu but she states he did not have a cough and was vomiting a lot. He was given medication for vomiting since he couldn't keep water down. He is doing better now but is still recovering. She is also experiencing chest pain and body aches, and since she has been using the bathroom so much she feels she is developing worsening hemorrhoids. Today she has not eaten anything or tried to drink any water, since she is unable to keep anything down. She also has sharp abdominal pain and bloating whenever she tries to eat. She is vegetarian and eat very less dairy products. She has been taking ibuprofen, and her stool is yellow and watery. She has some nasal congestion but denies cough. She has not taken any abx recently. Patient Active Problem List   Diagnosis Code    Neck pain M54.2    Acute bilateral back pain M54.9    Muscle spasms of neck M62.838    Muscle spasm of back M62.830       Past Surgical History:   Procedure Laterality Date    HX LIPOSUCTION Bilateral 2014       Family History   Problem Relation Age of Onset    Hypertension Mother        Social History     Social History    Marital status: LEGALLY      Spouse name: N/A    Number of children: N/A    Years of education: N/A     Occupational History    Not on file. Social History Main Topics    Smoking status: Never Smoker    Smokeless tobacco: Never Used    Alcohol use No    Drug use: No    Sexual activity: Yes     Birth control/ protection: None     Other Topics Concern    Not on file     Social History Narrative       Review of Systems   Constitutional: Positive for chills, fever and malaise/fatigue. HENT: Negative for congestion. Respiratory: Negative for cough and shortness of breath. Cardiovascular: Positive for chest pain. Negative for palpitations. Gastrointestinal: Positive for abdominal pain, diarrhea, nausea and vomiting. Negative for heartburn. Musculoskeletal: Positive for myalgias. Negative for joint pain. Neurological: Positive for weakness. Visit Vitals    /62    Pulse 64    Temp 98.9 °F (37.2 °C) (Oral)    Resp 20    Ht 5' 2\" (1.575 m)    Wt 140 lb (63.5 kg)    LMP 01/24/2018    SpO2 (!) 20%    BMI 25.61 kg/m2       Physical Exam   Constitutional: She is oriented to person, place, and time. She appears well-developed and well-nourished. No distress. HENT:   Right Ear: External ear normal.   Left Ear: External ear normal.   Eyes: Conjunctivae and EOM are normal. Right eye exhibits no discharge. Left eye exhibits no discharge. Neck: Normal range of motion. Neck supple. Cardiovascular: Normal rate and regular rhythm. Pulmonary/Chest: Effort normal and breath sounds normal. She has no wheezes. Abdominal: Soft. Bowel sounds are normal. There is tenderness. Lymphadenopathy:     She has no cervical adenopathy. Neurological: She is alert and oriented to person, place, and time. Skin: She is not diaphoretic. Psychiatric: She has a normal mood and affect. Her behavior is normal.   Nursing note and vitals reviewed. ASSESSMENT and PLAN    ICD-10-CM ICD-9-CM    1. Intractable vomiting with nausea, unspecified vomiting type R11.2 536.2 ondansetron (ZOFRAN ODT) 8 mg disintegrating tablet sent to pharmacy   2. Abdominal cramps R10.9 789.00 omeprazole (PRILOSEC) 20 mg capsule sent to pharmacy    She needs to drink plenty of water and Gatorade. Zofran given which she should take TID/prn for 3 days so she can keep these fluids down. She should continue to avoid dairy and eat simple foods (crackers, applesauce, rice).  She can start taking probiotics which will help with her diarrhea. 3. Myalgia M79.1 729.1 She should not take so much ibuprofen since she is not eating much and it will irritate her stomach further. 4. Epigastric pain R10.13 789.06 omeprazole (PRILOSEC) 20 mg capsule sent to pharmacy    I want her to start on Prilosec tomorrow morning, every morning on an empty stomach 30 minutes before meals. No need for abx at this time since I think this is gastritis which will resolve on its own. This plan was reviewed with the patient and patient agrees. All questions were answered. This scribe documentation was reviewed by me and accurately reflects the examination and decisions made by me. This note will not be viewable in [address]

## 2018-02-12 NOTE — LETTER
NOTIFICATION RETURN TO WORK 
 
2/12/2018 2:14 PM 
 
Ms. Cooper Tomlinson [address] [place] [address] To Whom It May Concern: 
 
Cooper Tomlinson is currently under the care of [facility]. She was seen in the office today for gastritis. Medications have been given and rest recommended for the next 2 days. She will return to work on: 14 February 2018. If there are questions or concerns please have the patient contact our office. Sincerely, Gideon Taylor MD

## 2018-06-18 ENCOUNTER — OFFICE VISIT (OUTPATIENT)
Dept: INTERNAL MEDICINE CLINIC | Age: 28
End: 2018-06-18

## 2018-06-18 VITALS
SYSTOLIC BLOOD PRESSURE: 118 MMHG | RESPIRATION RATE: 16 BRPM | TEMPERATURE: 98.8 F | BODY MASS INDEX: 24.14 KG/M2 | OXYGEN SATURATION: 98 % | DIASTOLIC BLOOD PRESSURE: 78 MMHG | WEIGHT: 131.2 LBS | HEART RATE: 96 BPM | HEIGHT: 62 IN

## 2018-06-18 DIAGNOSIS — T36.95XA ADVERSE REACTION TO ANTIBIOTIC, INITIAL ENCOUNTER: Primary | ICD-10-CM

## 2018-06-18 DIAGNOSIS — N64.4 BREAST PAIN, LEFT: ICD-10-CM

## 2018-06-18 RX ORDER — NAPROXEN 500 MG/1
500 TABLET ORAL 2 TIMES DAILY WITH MEALS
Qty: 10 TAB | Refills: 0 | Status: SHIPPED | OUTPATIENT
Start: 2018-06-18 | End: 2018-06-23

## 2018-06-18 NOTE — PROGRESS NOTES
Chief Complaint   Patient presents with    New Patient     patient went to [place] [facility] as left breast was swollen so much so that the nipple was inverted. states that it is still inverted and now feels like sh has a vaginal yeast infection. Patient did use monistat otc and it helped so much. It took care of the itching but it is some irritated.

## 2018-06-18 NOTE — MR AVS SNAPSHOT
[address] [facility] [address] 
[phone] Patient: Geraldine Patel MRN: [id number] Visit Information Date & Time Provider Department Dept. Phone Encounter #  
 2018  2:45 PM Chacho Chowdary MD [facility] [phone] [id number] Upcoming Health Maintenance Date Due DTaP/Tdap/Td series (1 - Tdap) 3/11/2011 PAP AKA CERVICAL CYTOLOGY 3/24/2018 Influenza Age 5 to Adult 2018 Allergies as of 2018  Review Complete On: 2018 By: Chacho Chowdary MD  
  
 Severity Noted Reaction Type Reactions Keflex [Cephalexin]  2018    Rash Current Immunizations  Never Reviewed No immunizations on file. Not reviewed this visit You Were Diagnosed With   
  
 Codes Comments Adverse reaction to antibiotic, initial encounter    -  Primary ICD-10-CM: T36.95XA ICD-9-CM: E930.9 Breast pain, left     ICD-10-CM: N64.4 ICD-9-CM: 611.71 Vitals BP Pulse Temp Resp Height(growth percentile) Weight(growth percentile) 118/78 (BP 1 Location: Left arm, BP Patient Position: Sitting) 96 98.8 °F (37.1 °C) (Oral) 16 5' 2\" (1.575 m) 131 lb 3.2 oz (59.5 kg) LMP SpO2 BMI OB Status Smoking Status 2018 98% 24 kg/m2 Having regular periods Never Smoker BMI and BSA Data Body Mass Index Body Surface Area  
 24 kg/m 2 1.61 m 2 Preferred Pharmacy Pharm[facility] Name [facility], [address] [place], [place] [address]vd AT [address] [place] [address] Drive [phone] Your Updated Medication List  
  
   
This list is accurate as of 18  5:14 PM.  Always use your most recent med list.  
  
  
  
  
 naproxen 500 mg tablet Commonly known as:  NAPROSYN Take 1 Tab by mouth two (2) times daily (with meals) for 5 days. ondansetron 8 mg disintegrating tablet Commonly known as:  ZOFRAN ODT  
 Take 1 Tab by mouth every eight (8) hours as needed for Nausea for up to 5 doses. Prescriptions Sent to Pharmacy Refills  
 naproxen (NAPROSYN) 500 mg tablet 0 Sig: Take 1 Tab by mouth two (2) times daily (with meals) for 5 days. Class: Normal  
 Pharmacy: [facility] [address] AT [address] [facility] #: [phone] Route: Oral  
  
Introducing [facility] SERVICES! Dear Luis Howard: Thank you for requesting a [employer] account. Our records indicate that you have previously registered for a [employer] account but its currently inactive. Please call our [employer] support line at [phone]. Additional Information If you have questions, please visit the Frequently Asked Questions section of the [employer] website at [email or web address]. [employer] - is now [employer]. [email or web address] Remember, [employer] is NOT to be used for urgent needs. For medical emergencies, dial 911. Now available from your iPhone and Android! Please provide this summary of care documentation to your next provider. Your primary care clinician is listed as Sven Duran. If you have any questions after today's visit, please call [phone].

## 2018-06-18 NOTE — PROGRESS NOTES
Written by Traci Chirinos, as dictated by Dr. Aakash Ovalles MD.    Jules Hutton is a 29 y.o. female. HPI  She is seen today for a follow-up from an emergency room visit. She had L breast pain and redness around her left nipple. She felt a knot under her armpit and went to the emergency room. She was put on Keflex, which she took for 1.5 days before she had an allergic reaction. She stopped taking Keflex. Redness has improved but she still has pain in her breast. There is no nipple discharge. She also had a yeast infection for which she took diflucan and it went away. Patient Active Problem List   Diagnosis Code    Neck pain M54.2    Acute bilateral back pain M54.9    Muscle spasms of neck M62.838    Muscle spasm of back M62.830        Current Outpatient Prescriptions on File Prior to Visit   Medication Sig Dispense Refill    ondansetron (ZOFRAN ODT) 8 mg disintegrating tablet Take 1 Tab by mouth every eight (8) hours as needed for Nausea for up to 5 doses. 15 Tab 0     No current facility-administered medications on file prior to visit. Allergies   Allergen Reactions    Keflex [Cephalexin] Rash       Past Surgical History:   Procedure Laterality Date    HX LIPOSUCTION Bilateral 2014       Family History   Problem Relation Age of Onset    Hypertension Mother        Social History     Social History    Marital status: LEGALLY      Spouse name: N/A    Number of children: N/A    Years of education: N/A     Occupational History    Not on file. Social History Main Topics    Smoking status: Never Smoker    Smokeless tobacco: Never Used    Alcohol use No    Drug use: No    Sexual activity: Yes     Birth control/ protection: None     Other Topics Concern    Not on file     Social History Narrative       Review of Systems   Constitutional: Negative for malaise/fatigue. Cardiovascular: Negative for chest pain and palpitations. Gastrointestinal: Negative for abdominal pain and heartburn. Musculoskeletal: Negative for joint pain and myalgias. Neurological: Negative for tingling, sensory change and weakness. Psychiatric/Behavioral: Negative for depression, memory loss and substance abuse. Visit Vitals    /78 (BP 1 Location: Left arm, BP Patient Position: Sitting)    Pulse 96    Temp 98.8 °F (37.1 °C) (Oral)    Resp 16    Ht 5' 2\" (1.575 m)    Wt 131 lb 3.2 oz (59.5 kg)    LMP 06/13/2018    SpO2 98%    BMI 24 kg/m2       Physical Exam   Constitutional: She is oriented to person, place, and time. She appears well-developed and well-nourished. HENT:   Right Ear: External ear normal.   Left Ear: External ear normal.   Eyes: Conjunctivae and EOM are normal.   Neck: Normal range of motion. Neck supple. Cardiovascular: Normal rate and regular rhythm. Pulmonary/Chest: Effort normal and breath sounds normal.   L breast glandular was tenderness on exam, no redness, no left axillary adenopathy   Abdominal: Soft. Bowel sounds are normal.   Neurological: She is alert and oriented to person, place, and time. Psychiatric: She has a normal mood and affect. Nursing note and vitals reviewed. ASSESSMENT and PLAN    ICD-10-CM ICD-9-CM    1. Adverse reaction to antibiotic, initial encounter T36.95XA E930.9 I told her that her reaction was probably a result of the abx. Her sxs are already improving and there is no need to give her any medication. 2. Breast pain, left N64.4 611.71 naproxen (NAPROSYN) 500 mg tablet sent to pharmacy. Naprosyn 500 mg given for three days. If her sxs don't improve and she still has pain, I will send her for an [address]. This plan was reviewed with the patient and patient agrees. All questions were answered. This scribe documentation was reviewed by me and accurately reflects the examination and decisions made by me. This note will not be viewable in [address]

## 2018-09-25 ENCOUNTER — OFFICE VISIT (OUTPATIENT)
Dept: PRIMARY CARE CLINIC | Age: 28
End: 2018-09-25

## 2018-09-25 VITALS
BODY MASS INDEX: 25.8 KG/M2 | HEART RATE: 87 BPM | WEIGHT: 140.2 LBS | RESPIRATION RATE: 16 BRPM | DIASTOLIC BLOOD PRESSURE: 75 MMHG | OXYGEN SATURATION: 100 % | SYSTOLIC BLOOD PRESSURE: 112 MMHG | TEMPERATURE: 98.8 F | HEIGHT: 62 IN

## 2018-09-25 DIAGNOSIS — R05.9 COUGH: ICD-10-CM

## 2018-09-25 DIAGNOSIS — J06.9 UPPER RESPIRATORY TRACT INFECTION, UNSPECIFIED TYPE: ICD-10-CM

## 2018-09-25 DIAGNOSIS — H66.001 ACUTE SUPPURATIVE OTITIS MEDIA OF RIGHT EAR WITHOUT SPONTANEOUS RUPTURE OF TYMPANIC MEMBRANE, RECURRENCE NOT SPECIFIED: Primary | ICD-10-CM

## 2018-09-25 RX ORDER — SULFAMETHOXAZOLE AND TRIMETHOPRIM 800; 160 MG/1; MG/1
1 TABLET ORAL 2 TIMES DAILY
Qty: 20 TAB | Refills: 0 | Status: SHIPPED | OUTPATIENT
Start: 2018-09-25 | End: 2018-10-05

## 2018-09-25 RX ORDER — BENZONATATE 200 MG/1
200 CAPSULE ORAL
Qty: 21 CAP | Refills: 0 | Status: SHIPPED | OUTPATIENT
Start: 2018-09-25 | End: 2018-10-02

## 2018-09-25 NOTE — PROGRESS NOTES
Chief Complaint Patient presents with  Sore Throat  
  states that this has been going on for a while off and on.  states that she has been having ear pain. states that she was sent home as she lost her voice yesterday.

## 2018-09-25 NOTE — PROGRESS NOTES
This note will not be viewable in [address] Joby Andino is a  29 y.o. female presents for visit. Chief Complaint Patient presents with  Sore Throat  
  states that this has been going on for a while off and on.  states that she has been having ear pain. states that she was sent home as she lost her voice yesterday. HPI Patient presents with report of sustained fever of 101°F, sore throat, ear pain, and laryngitis. She was sent home from work yesterday due to coughing and illness. She is taking Tylenol to break the fever and for body aches. She took 4 325 mg tablets this morning at 9 AM. Review of Systems Constitutional: Positive for chills and fever (101). HENT: Positive for congestion, ear pain and sore throat. Respiratory: Positive for cough (dry). Cardiovascular: Negative for chest pain and palpitations. Musculoskeletal: Positive for myalgias. Visit Vitals  /75 (BP 1 Location: Left arm, BP Patient Position: Sitting)  Pulse 87  Temp 98.8 °F (37.1 °C) (Oral)  Resp 16  
 Ht 5' 2\" (1.575 m)  Wt 140 lb 3.2 oz (63.6 kg)  LMP 08/25/2018  SpO2 100%  BMI 25.64 kg/m2 Physical Exam  
Constitutional: She is oriented to person, place, and time. She appears ill. HENT:  
Head: Normocephalic. Right Ear: There is tenderness. Tympanic membrane is bulging. A middle ear effusion is present. Left Ear: A middle ear effusion is present. Nose: Mucosal edema present. Rhinorrhea: yellow. Right sinus exhibits no maxillary sinus tenderness and no frontal sinus tenderness. Left sinus exhibits no maxillary sinus tenderness and no frontal sinus tenderness. Mouth/Throat: Uvula is midline and mucous membranes are normal. No oropharyngeal exudate or posterior oropharyngeal erythema. TMs opaque. Eyes: Conjunctivae are normal.  
Cardiovascular: Regular rhythm and normal heart sounds. No murmur heard. Pulmonary/Chest: Effort normal and breath sounds normal. She has no wheezes. She has no rales. Lymphadenopathy:  
  She has no cervical adenopathy. Neurological: She is alert and oriented to person, place, and time. Skin: Skin is warm and dry. Psychiatric: She has a normal mood and affect. Nursing note and vitals reviewed. Patient Active Problem List  
 Diagnosis Date Noted  Neck pain 05/11/2017  Acute bilateral back pain 05/11/2017  Muscle spasms of neck 05/11/2017  Muscle spasm of back 05/11/2017 ASSESSMENT AND PLAN: 
 
  ICD-10-CM ICD-9-CM 1. Acute suppurative otitis media of right ear without spontaneous rupture of tympanic membrane, recurrence not specified H66.001 382.00  
2. Upper respiratory tract infection, unspecified type J06.9 465.9 3. Cough R05 786.2 Orders Placed This Encounter  trimethoprim-sulfamethoxazole (BACTRIM DS, SEPTRA DS) 160-800 mg per tablet Sig: Take 1 Tab by mouth two (2) times a day for 10 days. Dispense:  20 Tab Refill:  0  
 benzonatate (TESSALON) 200 mg capsule Sig: Take 1 Cap by mouth three (3) times daily as needed for Cough for up to 7 days. Indications: Cough Dispense:  21 Cap Refill:  0 Diagnoses and all orders for this visit: 
 
1. Acute suppurative otitis media of right ear without spontaneous rupture of tympanic membrane, recurrence not specified 
-     trimethoprim-sulfamethoxazole (BACTRIM DS, SEPTRA DS) 160-800 mg per tablet; Take 1 Tab by mouth two (2) times a day for 10 days. 2. Upper respiratory tract infection, unspecified type 
-     trimethoprim-sulfamethoxazole (BACTRIM DS, SEPTRA DS) 160-800 mg per tablet; Take 1 Tab by mouth two (2) times a day for 10 days. 3. Cough 
-     benzonatate (TESSALON) 200 mg capsule; Take 1 Cap by mouth three (3) times daily as needed for Cough for up to 7 days. Indications: Cough Instructed patient to take a max of 1000 mg every 6 hours for fever. Not to exceed 4000 mg in 24 hours. She can use alternating Tylenol with ibuprofen as needed for persistent fever. Return to work note on September 27, 2018 written. Has not had a physical exam or Pap for several years. Will schedule a fasting physical after acute illness resolves. Follow-up Disposition: 
Return in about 4 weeks (around 10/23/2018), or if symptoms worsen or fail to improve, for FULL Phyisal Exam with PAP. Disclaimer: 
Advised her to call back or return to office if symptoms worsen/change/persist. 
Discussed expected course/resolution/complications of diagnosis in detail with patient. Medication risks/benefits/alternatives discussed with patient. She was given an after visit summary which includes diagnoses, current medications, & vitals. Discussed patient instructions and advised to read to all patient instructions regarding care. She expressed understanding with the diagnosis and plan.

## 2018-09-25 NOTE — LETTER
NOTIFICATION RETURN TO WORK / SCHOOL 
 
9/25/2018 11:28 AM 
 
Ms. Malathi Buchanan [address] [place] [address] To Whom It May Concern: 
 
Malathi Buchanan is currently under the care of Brett Presley. She will return to work/school on: 9/27/18. If there are questions or concerns please have the patient contact our office.  
 
 
 
Sincerely, 
 
 
Carmen Guardado NP

## 2018-09-25 NOTE — MR AVS SNAPSHOT
Rajat Ego 
 
 
 [address] 
[phone] Patient: Spike Lucas MRN: [id number] Visit Information Date & Time Provider Department Dept. Phone Encounter #  
 9/25/2018 11:00 AM Iván Glover NP [place] [facility] [address]. [phone] [id number] Follow-up Instructions Return in about 4 weeks (around 10/23/2018), or if symptoms worsen or fail to improve, for FULL Phyisal Exam with PAP. Upcoming Health Maintenance Date Due DTaP/Tdap/Td series (1 - Tdap) 3/11/2011 PAP AKA CERVICAL CYTOLOGY 3/24/2018 Influenza Age 5 to Adult 8/1/2018 Allergies as of 9/25/2018  Review Complete On: 9/25/2018 By: Iván Glover NP Severity Noted Reaction Type Reactions Keflex [Cephalexin]  06/18/2018    Rash Current Immunizations  Never Reviewed No immunizations on file. Not reviewed this visit You Were Diagnosed With   
  
 Codes Comments Acute suppurative otitis media of right ear without spontaneous rupture of tympanic membrane, recurrence not specified    -  Primary ICD-10-CM: H66.001 ICD-9-CM: 382.00 Upper respiratory tract infection, unspecified type     ICD-10-CM: J06.9 ICD-9-CM: 465.9 Cough     ICD-10-CM: R05 ICD-9-CM: 684. 2 Vitals BP Pulse Temp Resp Height(growth percentile) Weight(growth percentile) 112/75 (BP 1 Location: Left arm, BP Patient Position: Sitting) 87 98.8 °F (37.1 °C) (Oral) 16 5' 2\" (1.575 m) 140 lb 3.2 oz (63.6 kg) LMP SpO2 BMI OB Status Smoking Status 08/25/2018 100% 25.64 kg/m2 Having regular periods Never Smoker BMI and BSA Data Body Mass Index Body Surface Area  
 25.64 kg/m 2 1.67 m 2 Preferred Pharmacy Pharmacy Name Phone [facility], [facility] [phone] Your Updated Medication List  
  
 This list is accurate as of 9/25/18 11:32 AM.  Always use your most recent med list.  
  
  
  
  
 benzonatate 200 mg capsule Commonly known as:  TESSALON Take 1 Cap by mouth three (3) times daily as needed for Cough for up to 7 days. Indications: Cough  
  
 ondansetron 8 mg disintegrating tablet Commonly known as:  ZOFRAN ODT Take 1 Tab by mouth every eight (8) hours as needed for Nausea for up to 5 doses. trimethoprim-sulfamethoxazole 160-800 mg per tablet Commonly known as:  BACTRIM DS, SEPTRA DS Take 1 Tab by mouth two (2) times a day for 10 days. Prescriptions Sent to Pharmacy Refills  
 trimethoprim-sulfamethoxazole (BACTRIM DS, SEPTRA DS) 160-800 mg per tablet 0 Sig: Take 1 Tab by mouth two (2) times a day for 10 days. Class: Normal  
 Pharmacy: [facility] "[employer]" [facility] [address] AT [address] [place] [address] Drive P Ph #: [phone] Route: Oral  
 benzonatate (TESSALON) 200 mg capsule 0 Sig: Take 1 Cap by mouth three (3) times daily as needed for Cough for up to 7 days. Indications: Cough Class: Normal  
 Pharmacy: [facility] "[employer]" [facility] [address] [place] [address] Drive P Ph #: [phone] Route: Oral  
  
Follow-up Instructions Return in about 4 weeks (around 10/23/2018), or if symptoms worsen or fail to improve, for FULL Phyisal Exam with PAP. Patient Instructions Viral Respiratory Infection: Care Instructions Your Care Instructions Viruses are very small organisms. They grow in number after they enter your body. There are many types that cause different illnesses, such as colds and the mumps. The symptoms of a viral respiratory infection often start quickly. They include a fever, sore throat, and runny nose. You may also just not feel well. Or you may not want to eat much. Most viral respiratory infections are not serious.  They usually get better with time and self-care. Antibiotics are not used to treat a viral infection. That's because antibiotics will not help cure a viral illness. In some cases, antiviral medicine can help your body fight a serious viral infection. Follow-up care is a key part of your treatment and safety. Be sure to make and go to all appointments, and call your doctor if you are having problems. It's also a good idea to know your test results and keep a list of the medicines you take. How can you care for yourself at home? · Rest as much as possible until you feel better. · Be safe with medicines. Take your medicine exactly as prescribed. Call your doctor if you think you are having a problem with your medicine. You will get more details on the specific medicine your doctor prescribes. · Take an over-the-counter pain medicine, such as acetaminophen (Tylenol), ibuprofen (Advil, Motrin), or naproxen (Aleve), as needed for pain and fever. Read and follow all instructions on the label. Do not give aspirin to anyone younger than 20. It has been linked to Reye syndrome, a serious illness. · Drink plenty of fluids, enough so that your urine is light yellow or clear like water. Hot fluids, such as tea or soup, may help relieve congestion in your nose and throat. If you have kidney, heart, or liver disease and have to limit fluids, talk with your doctor before you increase the amount of fluids you drink. · Try to clear mucus from your lungs by breathing deeply and coughing. · Gargle with warm salt water once an hour. This can help reduce swelling and throat pain. Use 1 teaspoon of salt mixed in 1 cup of warm water. · Do not smoke or allow others to smoke around you. If you need help quitting, talk to your doctor about stop-smoking programs and medicines. These can increase your chances of quitting for good. To avoid spreading the virus · Cough or sneeze into a tissue. Then throw the tissue away. · If you don't have a tissue, use your hand to cover your cough or sneeze. Then clean your hand. You can also cough into your sleeve. · Wash your hands often. Use soap and warm water. Wash for 15 to 20 seconds each time. · If you don't have soap and water near you, you can clean your hands with alcohol wipes or gel. When should you call for help? Call your doctor now or seek immediate medical care if: 
  · You have a new or higher fever.  
  · Your fever lasts more than 48 hours.  
  · You have trouble breathing.  
  · You have a fever with a stiff neck or a severe headache.  
  · You are sensitive to light.  
  · You feel very sleepy or confused.  
 Watch closely for changes in your health, and be sure to contact your doctor if: 
  · You do not get better as expected. Where can you learn more? Go to http://leticia-joe.info/. Enter Z744 in the search box to learn more about \"Viral Respiratory Infection: Care Instructions. \" Current as of: December 6, 2017 Content Version: 11.7 © 0411-5549 [employer]. Care instructions adapted under license by [employer] (which disclaims liability or warranty for this information). If you have questions about a medical condition or this instruction, always ask your healthcare professional. [address] any warranty or liability for your use of this information. Analgesic/Antihistamine/Decongestant (By mouth) Treats runny nose, sneezing, stuffy nose, fever, sinus headaches, and mild aches caused by hay fever, colds, or flu.  
Brand Name(s): [place] Plus Cold, Allergy Relief + Cold, Allergy Relief + Sinus Headache, Benadryl Allergy Plus Cold, Benadryl Allergy Plus Sinus Headache, Benadryl Severe Allergy Plus Sinus Headache, Children's Dimetapp Multi-Symptom Cold & Flu, Children's Tylenol Plus Cold, Children's Tylenol Plus Cold & Allergy, Cold Medicine Plus, Comtrex Acute Head Cold, Comtrex Cold & Cough, Comtrex Flu Therapy, Comtrex Severe Cold & Sinus, Comtrex Sinus & Nasal Decongestant There may be other brand names for this medicine. When This Medicine Should Not Be Used: You should not use this medicine if you have had an allergic reaction to any cough, cold, or pain medicine, or if you have taken an MAO inhibitor such as Nardil®, Marplan®, Eldepryl®, or Parnate® within the past 14 days. Teenagers and children under age 12 should not use any medicines that contain aspirin if they have a virus infection such as chicken pox or the flu. Do not give any over-the-counter (OTC) cough and cold medicine to a baby or child under 3years old. Using these medicines in very young children might cause serious or possibly life-threatening side effects. How to Use This Medicine:  
Long Acting Capsule, Capsule, Liquid Filled Capsule, Tablet, Chewable Tablet, Long Acting Tablet, Fizzy Tablet, Liquid, Packet · Your doctor will tell you how much medicine to use. Do not use more than directed. · Follow the instructions on the medicine label if you are using this medicine without a prescription. · It is not safe to take more than 4 grams (4,000 milligrams) of acetaminophen in one day (24 hours). · Completely chew any chewable tablet before swallowing it. Swallow any other tablet or capsule whole. Do not crush, break, or chew. Measure the oral liquid medicine with a marked measuring spoon, oral syringe, or medicine cup. · If you use powder or effervescent tablet, stir the medicine into water and drink it right away. You must use at least 4 ounces (1/2 cup) of water to dissolve the effervescent tablet. Do not keep any mixture to take later. · If you are using medicine that makes a hot drink: Mix the powder in at least 6 ounces (3/4 cup) of hot water. Or, you may mix the powder in 6 ounces (3/4 cup) of cool water and heat it in a microwave oven.  Read the directions on the package to find out how long to heat the mixture. Carefully drink the hot mixture right away. If a dose is missed: · Take a dose as soon as you remember. If it is almost time for your next dose, wait until then and take a regular dose. Do not take extra medicine to make up for a missed dose. How to Store and Dispose of This Medicine: · Store the medicine in a closed container at room temperature, away from heat, moisture, and direct light. Ask your pharmacist, doctor, or health caregiver about the best way to dispose of any outdated medicine or medicine no longer needed. · Keep all medicine out of the reach of children. Never share your medicine with anyone. Drugs and Foods to Avoid: Ask your doctor or pharmacist before using any other medicine, including over-the-counter medicines, vitamins, and herbal products. · Do not use this medicine if you are also using a blood thinner such as warfarin (Coumadin®), unless your doctor has told you to. Avoid drinking alcohol or using any other medicines that make you sleepy. These include sleeping pills, other cold and allergy medicine, narcotic pain relievers, and sedatives. · If this medicine contains acetaminophen: If you regularly drink 3 or more alcoholic drinks every day, do not take acetaminophen without asking your doctor. Do not also use other medicines that contain acetaminophen, or you may be getting more than a safe amount of this medicine. Many combination medicines contain acetaminophen, including products with brand names such as [place] Plus®, Comtrex®, Drixoral®, Excedrin Migraine®, Midol®, Sinutab®, Sudafed®, Theraflu®, and Vanquish®. Carefully check the labels of all other medicines you are using to be sure they do not contain acetaminophen. Warnings While Using This Medicine: · Make sure your doctor knows if you are pregnant or breastfeeding, or if you have heart disease, high blood pressure, seizure disorders, asthma, emphysema, diabetes, glaucoma, or an overactive thyroid. · If your symptoms do not improve within 7 days or if they get worse, call your doctor. If you have a severe sore throat, fever, or thick yellow or green mucus, call your doctor. · This medicine might contain phenylalanine (aspartame). This is only a concern if you have a disorder called phenylketonuria (a problem with amino acids). Talk to your doctor before using this medicine. · This medicine can make you drowsy or restless. Avoid taking at bedtime if it makes you restless. Avoid driving, using machines, or doing anything else that could be dangerous if you are not alert. · If you are allergic to aspirin, you may also be allergic to ibuprofen. Ask your pharmacist or read the medicine label to see what kind of pain medicine this product contains. · If your medicine contains aspirin and has a vinegar smell, do not use it. · Some brands of this medicine may contain alcohol. Read the label carefully or ask your pharmacist so you know what is in your product. · Children may be more sensitive to this medicine than adults, especially if they take too much. Always read the medicine label closely so you give your child the right amount. Ask your pharmacist or doctor if you are not sure how much medicine to give your child. Possible Side Effects While Using This Medicine:  
Call your doctor right away if you notice any of these side effects: · Allergic reaction: Itching or hives, swelling in your face or hands, swelling or tingling in your mouth or throat, chest tightness, trouble breathing · Fast, pounding, or irregular heartbeat · Severe headache, ringing or buzzing in your ears · Bloody or black, tarry stools, severe stomach pain, vomiting blood or material that looks like coffee grounds If you notice these less serious side effects, talk with your doctor: · Dry mouth, nose, or throat · Nausea · Trouble urinating If you notice other side effects that you think are caused by this medicine, tell your doctor. Call your doctor for medical advice about side effects. You may report side effects to FDA at 9-082-UCN-4854 © 2017 Pat Paiz Information is for End User's use only and may not be sold, redistributed or otherwise used for commercial purposes. The above information is an  only. It is not intended as medical advice for individual conditions or treatments. Talk to your doctor, nurse or pharmacist before following any medical regimen to see if it is safe and effective for you. Introducing [facility]! Dear Suzi Cheung: Thank you for requesting a [employer] account. Our records indicate that you have previously registered for a [employer] account but its currently inactive. Please call our [employer] support line at [phone]. Additional Information If you have questions, please visit the Frequently Asked Questions section of the [employer] website at [email or web address] Remember, [employer] is NOT to be used for urgent needs. For medical emergencies, dial 911. Now available from your iPhone and Android! Please provide this summary of care documentation to your next provider. Your primary care clinician is listed as Franklin Valle. If you have any questions after today's visit, please call [phone].

## 2018-09-25 NOTE — PATIENT INSTRUCTIONS
Viral Respiratory Infection: Care Instructions Your Care Instructions Viruses are very small organisms. They grow in number after they enter your body. There are many types that cause different illnesses, such as colds and the mumps. The symptoms of a viral respiratory infection often start quickly. They include a fever, sore throat, and runny nose. You may also just not feel well. Or you may not want to eat much. Most viral respiratory infections are not serious. They usually get better with time and self-care. Antibiotics are not used to treat a viral infection. That's because antibiotics will not help cure a viral illness. In some cases, antiviral medicine can help your body fight a serious viral infection. Follow-up care is a key part of your treatment and safety. Be sure to make and go to all appointments, and call your doctor if you are having problems. It's also a good idea to know your test results and keep a list of the medicines you take. How can you care for yourself at home? · Rest as much as possible until you feel better. · Be safe with medicines. Take your medicine exactly as prescribed. Call your doctor if you think you are having a problem with your medicine. You will get more details on the specific medicine your doctor prescribes. · Take an over-the-counter pain medicine, such as acetaminophen (Tylenol), ibuprofen (Advil, Motrin), or naproxen (Aleve), as needed for pain and fever. Read and follow all instructions on the label. Do not give aspirin to anyone younger than 20. It has been linked to Reye syndrome, a serious illness. · Drink plenty of fluids, enough so that your urine is light yellow or clear like water. Hot fluids, such as tea or soup, may help relieve congestion in your nose and throat. If you have kidney, heart, or liver disease and have to limit fluids, talk with your doctor before you increase the amount of fluids you drink. · Try to clear mucus from your lungs by breathing deeply and coughing. · Gargle with warm salt water once an hour. This can help reduce swelling and throat pain. Use 1 teaspoon of salt mixed in 1 cup of warm water. · Do not smoke or allow others to smoke around you. If you need help quitting, talk to your doctor about stop-smoking programs and medicines. These can increase your chances of quitting for good. To avoid spreading the virus · Cough or sneeze into a tissue. Then throw the tissue away. · If you don't have a tissue, use your hand to cover your cough or sneeze. Then clean your hand. You can also cough into your sleeve. · Wash your hands often. Use soap and warm water. Wash for 15 to 20 seconds each time. · If you don't have soap and water near you, you can clean your hands with alcohol wipes or gel. When should you call for help? Call your doctor now or seek immediate medical care if: 
  · You have a new or higher fever.  
  · Your fever lasts more than 48 hours.  
  · You have trouble breathing.  
  · You have a fever with a stiff neck or a severe headache.  
  · You are sensitive to light.  
  · You feel very sleepy or confused.  
 Watch closely for changes in your health, and be sure to contact your doctor if: 
  · You do not get better as expected. Where can you learn more? Go to http://leticia-joe.info/. Enter E465 in the search box to learn more about \"Viral Respiratory Infection: Care Instructions. \" Current as of: December 6, 2017 Content Version: 11.7 © 8234-3606 [facility]. Care instructions adapted under license by [email or web address] (which disclaims liability or warranty for this information). If you have questions about a medical condition or this instruction, always ask your healthcare professional. [place] [address] any warranty or liability for your use of this information. Analgesic/Antihistamine/Decongestant (By mouth) Treats runny nose, sneezing, stuffy nose, fever, sinus headaches, and mild aches caused by hay fever, colds, or flu. Brand Name(s): Antonietta-Cromwell Plus Cold, Allergy Relief + Cold, Allergy Relief + Sinus Headache, Benadryl Allergy Plus Cold, Benadryl Allergy Plus Sinus Headache, Benadryl Severe Allergy Plus Sinus Headache, Children's Dimetapp Multi-Symptom Cold & Flu, Children's Tylenol Plus Cold, Children's Tylenol Plus Cold & Allergy, Cold Medicine Plus, Comtrex Acute Head Cold, Comtrex Cold & Cough, Comtrex Flu Therapy, Comtrex Severe Cold & Sinus, Comtrex Sinus & Nasal Decongestant There may be other brand names for this medicine. When This Medicine Should Not Be Used: You should not use this medicine if you have had an allergic reaction to any cough, cold, or pain medicine, or if you have taken an MAO inhibitor such as Nardil®, Marplan®, Eldepryl®, or Parnate® within the past 14 days. Teenagers and children under age 12 should not use any medicines that contain aspirin if they have a virus infection such as chicken pox or the flu. Do not give any over-the-counter (OTC) cough and cold medicine to a baby or child under 3years old. Using these medicines in very young children might cause serious or possibly life-threatening side effects. How to Use This Medicine:  
Long Acting Capsule, Capsule, Liquid Filled Capsule, Tablet, Chewable Tablet, Long Acting Tablet, Fizzy Tablet, Liquid, Packet · Your doctor will tell you how much medicine to use. Do not use more than directed. · Follow the instructions on the medicine label if you are using this medicine without a prescription. · It is not safe to take more than 4 grams (4,000 milligrams) of acetaminophen in one day (24 hours). · Completely chew any chewable tablet before swallowing it. Swallow any other tablet or capsule whole. Do not crush, break, or chew. Measure the oral liquid medicine with a marked measuring spoon, oral syringe, or medicine cup. · If you use powder or effervescent tablet, stir the medicine into water and drink it right away. You must use at least 4 ounces (1/2 cup) of water to dissolve the effervescent tablet. Do not keep any mixture to take later. · If you are using medicine that makes a hot drink: Mix the powder in at least 6 ounces (3/4 cup) of hot water. Or, you may mix the powder in 6 ounces (3/4 cup) of cool water and heat it in a microwave oven. Read the directions on the package to find out how long to heat the mixture. Carefully drink the hot mixture right away. If a dose is missed: · Take a dose as soon as you remember. If it is almost time for your next dose, wait until then and take a regular dose. Do not take extra medicine to make up for a missed dose. How to Store and Dispose of This Medicine: · Store the medicine in a closed container at room temperature, away from heat, moisture, and direct light. Ask your pharmacist, doctor, or health caregiver about the best way to dispose of any outdated medicine or medicine no longer needed. · Keep all medicine out of the reach of children. Never share your medicine with anyone. Drugs and Foods to Avoid: Ask your doctor or pharmacist before using any other medicine, including over-the-counter medicines, vitamins, and herbal products. · Do not use this medicine if you are also using a blood thinner such as warfarin (Coumadin®), unless your doctor has told you to. Avoid drinking alcohol or using any other medicines that make you sleepy. These include sleeping pills, other cold and allergy medicine, narcotic pain relievers, and sedatives. · If this medicine contains acetaminophen: If you regularly drink 3 or more alcoholic drinks every day, do not take acetaminophen without asking your doctor. Do not also use other medicines that contain acetaminophen, or you may be getting more than a safe amount of this medicine.  Many combination medicines contain acetaminophen, including products with brand names such as Antonietta-Jackson Plus®, Comtrex®, Drixoral®, Excedrin Migraine®, Midol®, Sinutab®, Sudafed®, Theraflu®, and Vanquish®. Carefully check the labels of all other medicines you are using to be sure they do not contain acetaminophen. Warnings While Using This Medicine: · Make sure your doctor knows if you are pregnant or breastfeeding, or if you have heart disease, high blood pressure, seizure disorders, asthma, emphysema, diabetes, glaucoma, or an overactive thyroid. · If your symptoms do not improve within 7 days or if they get worse, call your doctor. If you have a severe sore throat, fever, or thick yellow or green mucus, call your doctor. · This medicine might contain phenylalanine (aspartame). This is only a concern if you have a disorder called phenylketonuria (a problem with amino acids). Talk to your doctor before using this medicine. · This medicine can make you drowsy or restless. Avoid taking at bedtime if it makes you restless. Avoid driving, using machines, or doing anything else that could be dangerous if you are not alert. · If you are allergic to aspirin, you may also be allergic to ibuprofen. Ask your pharmacist or read the medicine label to see what kind of pain medicine this product contains. · If your medicine contains aspirin and has a vinegar smell, do not use it. · Some brands of this medicine may contain alcohol. Read the label carefully or ask your pharmacist so you know what is in your product. · Children may be more sensitive to this medicine than adults, especially if they take too much. Always read the medicine label closely so you give your child the right amount. Ask your pharmacist or doctor if you are not sure how much medicine to give your child. Possible Side Effects While Using This Medicine:  
Call your doctor right away if you notice any of these side effects: · Allergic reaction: Itching or hives, swelling in your face or hands, swelling or tingling in your mouth or throat, chest tightness, trouble breathing · Fast, pounding, or irregular heartbeat · Severe headache, ringing or buzzing in your ears · Bloody or black, tarry stools, severe stomach pain, vomiting blood or material that looks like coffee grounds If you notice these less serious side effects, talk with your doctor: · Dry mouth, nose, or throat · Nausea · Trouble urinating If you notice other side effects that you think are caused by this medicine, tell your doctor. Call your doctor for medical advice about side effects. You may report side effects to FDA at 8-505-FDA-1618 © 2017 [address] Information is for End User's use only and may not be sold, redistributed or otherwise used for commercial purposes. The above information is an  only. It is not intended as medical advice for individual conditions or treatments. Talk to your doctor, nurse or pharmacist before following any medical regimen to see if it is safe and effective for you.

## 2018-10-24 ENCOUNTER — OFFICE VISIT (OUTPATIENT)
Dept: PRIMARY CARE CLINIC | Age: 28
End: 2018-10-24

## 2018-10-24 VITALS
DIASTOLIC BLOOD PRESSURE: 78 MMHG | SYSTOLIC BLOOD PRESSURE: 134 MMHG | RESPIRATION RATE: 17 BRPM | HEART RATE: 81 BPM | WEIGHT: 139.4 LBS | HEIGHT: 62 IN | TEMPERATURE: 98.2 F | BODY MASS INDEX: 25.65 KG/M2

## 2018-10-24 DIAGNOSIS — Z01.419 WELL FEMALE EXAM WITH ROUTINE GYNECOLOGICAL EXAM: ICD-10-CM

## 2018-10-24 DIAGNOSIS — E55.9 VITAMIN D DEFICIENCY: ICD-10-CM

## 2018-10-24 DIAGNOSIS — Z00.00 PHYSICAL EXAM: Primary | ICD-10-CM

## 2018-10-24 NOTE — PROGRESS NOTES
Written by Laney Valencia, as dictated by Dr. Magalis Malhotra MD. 
 
Satish Reyes is a 29 y.o. female. HPI The patient comes in today for a complete physical examination and pap smear. She is not fasting for labs. Patient notes that she has not had a pap smear in a long time as her last pap was normal. She notes that she has not felt like she truly recovered since she was prescribed medication which caused a bad yeast infection. She notes that she can no longer shave her pubic area. She has never had an IUD and she is not taking hormonal pills. Patient notes that her periods have been heavy since the yeast infection. She reports that she is not pregnant and her last period was on 10/03. She is sexually active. Patient notes that she gets white discharge before her cycle. Patient denies seasonal allergies, sore throat. She notes that last week she experiencing some increased urinary frequency and mild dysuria. She weighs 139 lbs today and has lost weight from 140 lbs in 09/2018. She does not eat meat or dairy. She notes that she has been losing weight since she stopped eating \"junk. \" Patient notes that despite eating lots of vegetables, she has been a little constipated. She notes that her bowel movements have been more regular recently. The patient has discoloration of the skin on her back. Patient notes that the skin was red. She was seen by a dermatologist and given a topical prescription which has improved the color. Patient refuses a flu shot and notes that she has never received a flu shot. Patient Active Problem List  
Diagnosis Code  Neck pain M54.2  Acute bilateral back pain M54.9  Muscle spasms of neck M62.838  
 Muscle spasm of back M62.830 Current Outpatient Medications on File Prior to Visit Medication Sig Dispense Refill  ondansetron (ZOFRAN ODT) 8 mg disintegrating tablet Take 1 Tab by mouth every eight (8) hours as needed for Nausea for up to 5 doses. 15 Tab 0 No current facility-administered medications on file prior to visit. Allergies Allergen Reactions  Keflex [Cephalexin] Rash Past Surgical History:  
Procedure Laterality Date  HX LIPOSUCTION Bilateral 2014 Family History Problem Relation Age of Onset  Hypertension Mother Social History Socioeconomic History  Marital status: LEGALLY  Spouse name: Not on file  Number of children: Not on file  Years of education: Not on file  Highest education level: Not on file Social Needs  Financial resource strain: Not on file  Food insecurity - worry: Not on file  Food insecurity - inability: Not on file  Transportation needs - medical: Not on file  Transportation needs - non-medical: Not on file Occupational History  Not on file Tobacco Use  Smoking status: Never Smoker  Smokeless tobacco: Never Used Substance and Sexual Activity  Alcohol use: No  
 Drug use: No  
 Sexual activity: Yes Birth control/protection: None Other Topics Concern  Not on file Social History Narrative  Not on file Review of Systems Constitutional: Negative for malaise/fatigue. HENT: Negative for congestion and sore throat. Eyes: Negative for blurred vision and pain. Respiratory: Negative for cough and shortness of breath. Cardiovascular: Negative for chest pain and palpitations. Gastrointestinal: Positive for constipation. Negative for abdominal pain and heartburn. Genitourinary: Positive for dysuria and frequency. Negative for urgency. Musculoskeletal: Negative for joint pain and myalgias. Neurological: Negative for dizziness, tingling, sensory change, weakness and headaches. Endo/Heme/Allergies: Negative for environmental allergies. Psychiatric/Behavioral: Negative for depression, memory loss and substance abuse. Visit Vitals /78 (BP 1 Location: Right arm, BP Patient Position: Sitting) Pulse 81 Temp 98.2 °F (36.8 °C) (Oral) Resp 17 Ht 5' 2\" (1.575 m) Wt 139 lb 6.4 oz (63.2 kg) LMP 10/01/2018 BMI 25.50 kg/m² Physical Exam  
Constitutional: She is oriented to person, place, and time. She appears well-developed and well-nourished. No distress. HENT:  
Right Ear: External ear normal.  
Left Ear: External ear normal.  
Mouth/Throat: Oropharynx is clear and moist.  
Eyes: Conjunctivae and EOM are normal. Right eye exhibits no discharge. Left eye exhibits no discharge. Neck: Normal range of motion. Neck supple. Cardiovascular: Normal rate and regular rhythm. Pulmonary/Chest: Effort normal and breath sounds normal. She has no wheezes. Abdominal: Soft. Bowel sounds are normal. There is no tenderness. Genitourinary: Vaginal discharge found. Genitourinary Comments: +white discharge Lymphadenopathy:  
  She has no cervical adenopathy. Neurological: She is alert and oriented to person, place, and time. Skin: She is not diaphoretic. Psychiatric: She has a normal mood and affect. Her behavior is normal.  
Nursing note and vitals reviewed. ASSESSMENT and PLAN 
  ICD-10-CM ICD-9-CM 1. Physical exam G11.08 Q38.7 METABOLIC PANEL, COMPREHENSIVE  
   CBC W/O DIFF  
   LIPID PANEL  
   TSH 3RD GENERATION Complete physical exam done. Pt will return during lab hours to have basic fasting labs drawn. 2. Vitamin D deficiency E55.9 268.9 VITAMIN D, 25 HYDROXY Vitamin D ordered. 3. Well female exam with routine gynecological exam Z01.419 V72.31 PAP IG, APTIMA HPV AND RFX 16/18,45 ([id number]) Pap smear performed. Patient tolerated well. I will check her for a yeast infection and contact her with her results. This plan was reviewed with the patient and patient agrees. All questions were answered.  
 
This scribe documentation was reviewed by me and accurately reflects the examination and decisions made by me.

## 2018-10-24 NOTE — PROGRESS NOTES
Chief Complaint Patient presents with  Complete Physical  
  with pap and patient is not fasting.  states that recently she had antibiotics and she had a yeast infection and started with pain in the side.

## 2018-10-26 LAB
CYTOLOGIST CVX/VAG CYTO: ABNORMAL
CYTOLOGY CVX/VAG DOC THIN PREP: ABNORMAL
DX ICD CODE: ABNORMAL
DX ICD CODE: ABNORMAL
HPV I/H RISK 4 DNA CVX QL PROBE+SIG AMP: POSITIVE
Lab: ABNORMAL
OTHER STN SPEC: ABNORMAL
PATH REPORT.FINAL DX SPEC: ABNORMAL
PATHOLOGIST CVX/VAG CYTO: ABNORMAL
STAT OF ADQ CVX/VAG CYTO-IMP: ABNORMAL